# Patient Record
Sex: FEMALE | Race: WHITE | NOT HISPANIC OR LATINO | URBAN - METROPOLITAN AREA
[De-identification: names, ages, dates, MRNs, and addresses within clinical notes are randomized per-mention and may not be internally consistent; named-entity substitution may affect disease eponyms.]

---

## 2017-05-02 ENCOUNTER — FOLLOW UP ESTABLISHED (OUTPATIENT)
Dept: URBAN - METROPOLITAN AREA CLINIC 44 | Facility: CLINIC | Age: 82
End: 2017-05-02
Payer: COMMERCIAL

## 2017-05-02 PROCEDURE — 99213 OFFICE O/P EST LOW 20 MIN: CPT | Performed by: OPHTHALMOLOGY

## 2017-05-02 ASSESSMENT — INTRAOCULAR PRESSURE
OD: 16
OS: 16

## 2017-08-15 ENCOUNTER — FOLLOW UP ESTABLISHED (OUTPATIENT)
Dept: URBAN - METROPOLITAN AREA CLINIC 44 | Facility: CLINIC | Age: 82
End: 2017-08-15
Payer: COMMERCIAL

## 2017-08-15 PROCEDURE — 92012 INTRM OPH EXAM EST PATIENT: CPT | Performed by: OPHTHALMOLOGY

## 2017-08-15 ASSESSMENT — INTRAOCULAR PRESSURE
OS: 14
OD: 12

## 2017-11-20 ENCOUNTER — FOLLOW UP ESTABLISHED (OUTPATIENT)
Dept: URBAN - METROPOLITAN AREA CLINIC 44 | Facility: CLINIC | Age: 82
End: 2017-11-20
Payer: COMMERCIAL

## 2017-11-20 DIAGNOSIS — H35.3131 NONEXUDATIVE MACULAR DEGENERATION, EARLY DRY STAGE, BILATERAL: ICD-10-CM

## 2017-11-20 DIAGNOSIS — H40.1131 PRIMARY OPEN-ANGLE GLAUCOMA, BILATERAL, MILD STAGE: ICD-10-CM

## 2017-11-20 PROCEDURE — 92014 COMPRE OPH EXAM EST PT 1/>: CPT | Performed by: OPHTHALMOLOGY

## 2017-11-20 PROCEDURE — 92134 CPTRZ OPH DX IMG PST SGM RTA: CPT | Performed by: OPHTHALMOLOGY

## 2017-11-20 ASSESSMENT — KERATOMETRY
OS: 44.00
OD: 43.88

## 2017-11-20 ASSESSMENT — INTRAOCULAR PRESSURE
OD: 15
OS: 15

## 2017-11-20 ASSESSMENT — VISUAL ACUITY
OD: 20/25
OS: 20/25

## 2018-01-16 ENCOUNTER — FOLLOW UP ESTABLISHED (OUTPATIENT)
Dept: URBAN - METROPOLITAN AREA CLINIC 44 | Facility: CLINIC | Age: 83
End: 2018-01-16
Payer: MEDICARE

## 2018-01-16 PROCEDURE — 67028 INJECTION EYE DRUG: CPT | Performed by: OPHTHALMOLOGY

## 2018-01-16 PROCEDURE — 92014 COMPRE OPH EXAM EST PT 1/>: CPT | Performed by: OPHTHALMOLOGY

## 2018-01-16 PROCEDURE — 92242 FLUORESCEIN&ICG ANGIOGRAPHY: CPT | Performed by: OPHTHALMOLOGY

## 2018-01-16 PROCEDURE — 92134 CPTRZ OPH DX IMG PST SGM RTA: CPT | Performed by: OPHTHALMOLOGY

## 2018-01-16 ASSESSMENT — INTRAOCULAR PRESSURE
OS: 14
OD: 14

## 2018-02-13 ENCOUNTER — FOLLOW UP ESTABLISHED (OUTPATIENT)
Dept: URBAN - METROPOLITAN AREA CLINIC 44 | Facility: CLINIC | Age: 83
End: 2018-02-13
Payer: MEDICARE

## 2018-02-13 PROCEDURE — 92134 CPTRZ OPH DX IMG PST SGM RTA: CPT | Performed by: OPHTHALMOLOGY

## 2018-02-13 PROCEDURE — 67028 INJECTION EYE DRUG: CPT | Performed by: OPHTHALMOLOGY

## 2018-02-13 ASSESSMENT — INTRAOCULAR PRESSURE
OD: 16
OS: 16

## 2018-03-13 ENCOUNTER — FOLLOW UP ESTABLISHED (OUTPATIENT)
Dept: URBAN - METROPOLITAN AREA CLINIC 44 | Facility: CLINIC | Age: 83
End: 2018-03-13
Payer: MEDICARE

## 2018-03-13 PROCEDURE — 67028 INJECTION EYE DRUG: CPT | Performed by: OPHTHALMOLOGY

## 2018-03-13 PROCEDURE — 92134 CPTRZ OPH DX IMG PST SGM RTA: CPT | Performed by: OPHTHALMOLOGY

## 2018-03-13 ASSESSMENT — INTRAOCULAR PRESSURE
OD: 13
OS: 14

## 2018-03-20 ENCOUNTER — FOLLOW UP ESTABLISHED (OUTPATIENT)
Dept: URBAN - METROPOLITAN AREA CLINIC 44 | Facility: CLINIC | Age: 83
End: 2018-03-20
Payer: MEDICARE

## 2018-03-20 DIAGNOSIS — H25.813 COMBINED FORMS OF AGE-RELATED CATARACT, BILATERAL: ICD-10-CM

## 2018-03-20 PROCEDURE — 92250 FUNDUS PHOTOGRAPHY W/I&R: CPT | Performed by: OPTOMETRIST

## 2018-03-20 PROCEDURE — 92014 COMPRE OPH EXAM EST PT 1/>: CPT | Performed by: OPTOMETRIST

## 2018-03-20 PROCEDURE — 92083 EXTENDED VISUAL FIELD XM: CPT | Performed by: OPTOMETRIST

## 2018-03-20 ASSESSMENT — VISUAL ACUITY
OS: 20/25
OD: 20/20

## 2018-03-20 ASSESSMENT — INTRAOCULAR PRESSURE
OD: 10
OS: 10
OD: 15
OS: 15

## 2018-03-20 ASSESSMENT — KERATOMETRY
OS: 43.88
OD: 44.13

## 2018-04-10 ENCOUNTER — FOLLOW UP ESTABLISHED (OUTPATIENT)
Dept: URBAN - METROPOLITAN AREA CLINIC 44 | Facility: CLINIC | Age: 83
End: 2018-04-10
Payer: MEDICARE

## 2018-04-10 DIAGNOSIS — H35.3221 EXDTVE AGE-REL MCLR DEGN, LEFT EYE, WITH ACTV CHRDL NEOVAS: ICD-10-CM

## 2018-04-10 PROCEDURE — 67028 INJECTION EYE DRUG: CPT | Performed by: OPHTHALMOLOGY

## 2018-04-10 PROCEDURE — 92134 CPTRZ OPH DX IMG PST SGM RTA: CPT | Performed by: OPHTHALMOLOGY

## 2018-04-10 PROCEDURE — 92014 COMPRE OPH EXAM EST PT 1/>: CPT | Performed by: OPHTHALMOLOGY

## 2018-04-10 ASSESSMENT — INTRAOCULAR PRESSURE
OD: 17
OS: 12

## 2018-05-08 ENCOUNTER — FOLLOW UP ESTABLISHED (OUTPATIENT)
Dept: URBAN - METROPOLITAN AREA CLINIC 44 | Facility: CLINIC | Age: 83
End: 2018-05-08
Payer: MEDICARE

## 2018-05-08 PROCEDURE — 67028 INJECTION EYE DRUG: CPT | Performed by: OPHTHALMOLOGY

## 2018-05-08 PROCEDURE — 92134 CPTRZ OPH DX IMG PST SGM RTA: CPT | Performed by: OPHTHALMOLOGY

## 2018-05-08 ASSESSMENT — INTRAOCULAR PRESSURE
OD: 16
OS: 13

## 2018-06-19 ENCOUNTER — FOLLOW UP ESTABLISHED (OUTPATIENT)
Dept: URBAN - METROPOLITAN AREA CLINIC 44 | Facility: CLINIC | Age: 83
End: 2018-06-19
Payer: MEDICARE

## 2018-06-19 PROCEDURE — 67028 INJECTION EYE DRUG: CPT | Performed by: OPHTHALMOLOGY

## 2018-06-19 PROCEDURE — 92134 CPTRZ OPH DX IMG PST SGM RTA: CPT | Performed by: OPHTHALMOLOGY

## 2018-06-19 ASSESSMENT — INTRAOCULAR PRESSURE
OS: 10
OD: 10

## 2018-07-18 ENCOUNTER — FOLLOW UP ESTABLISHED (OUTPATIENT)
Dept: URBAN - METROPOLITAN AREA CLINIC 44 | Facility: CLINIC | Age: 83
End: 2018-07-18
Payer: MEDICARE

## 2018-07-18 PROCEDURE — 92012 INTRM OPH EXAM EST PATIENT: CPT | Performed by: OPTOMETRIST

## 2018-07-18 PROCEDURE — 92133 CPTRZD OPH DX IMG PST SGM ON: CPT | Performed by: OPTOMETRIST

## 2018-07-18 ASSESSMENT — INTRAOCULAR PRESSURE
OS: 13
OD: 14

## 2018-07-25 ENCOUNTER — FOLLOW UP ESTABLISHED (OUTPATIENT)
Dept: URBAN - METROPOLITAN AREA CLINIC 44 | Facility: CLINIC | Age: 83
End: 2018-07-25
Payer: MEDICARE

## 2018-07-25 PROCEDURE — 67028 INJECTION EYE DRUG: CPT | Performed by: OPHTHALMOLOGY

## 2018-07-25 PROCEDURE — 92014 COMPRE OPH EXAM EST PT 1/>: CPT | Performed by: OPHTHALMOLOGY

## 2018-07-25 PROCEDURE — 92242 FLUORESCEIN&ICG ANGIOGRAPHY: CPT | Performed by: OPHTHALMOLOGY

## 2018-07-25 PROCEDURE — 92134 CPTRZ OPH DX IMG PST SGM RTA: CPT | Performed by: OPHTHALMOLOGY

## 2018-07-25 ASSESSMENT — INTRAOCULAR PRESSURE
OD: 13
OS: 15

## 2018-10-03 ENCOUNTER — FOLLOW UP ESTABLISHED (OUTPATIENT)
Dept: URBAN - METROPOLITAN AREA CLINIC 44 | Facility: CLINIC | Age: 83
End: 2018-10-03
Payer: MEDICARE

## 2018-10-03 PROCEDURE — 92134 CPTRZ OPH DX IMG PST SGM RTA: CPT | Performed by: OPHTHALMOLOGY

## 2018-10-03 PROCEDURE — 67028 INJECTION EYE DRUG: CPT | Performed by: OPHTHALMOLOGY

## 2018-10-03 ASSESSMENT — INTRAOCULAR PRESSURE
OS: 14
OD: 12

## 2018-11-27 ENCOUNTER — FOLLOW UP ESTABLISHED (OUTPATIENT)
Dept: URBAN - METROPOLITAN AREA CLINIC 44 | Facility: CLINIC | Age: 83
End: 2018-11-27
Payer: MEDICARE

## 2018-11-27 PROCEDURE — 92012 INTRM OPH EXAM EST PATIENT: CPT | Performed by: OPTOMETRIST

## 2018-11-27 RX ORDER — BIMATOPROST 0.1 MG/ML
0.01 % SOLUTION/ DROPS OPHTHALMIC
Qty: 3 | Refills: 3 | Status: INACTIVE
Start: 2018-11-27 | End: 2019-12-16

## 2018-11-27 RX ORDER — DORZOLAMIDE HYDROCHLORIDE AND TIMOLOL MALEATE 20; 5 MG/ML; MG/ML
SOLUTION/ DROPS OPHTHALMIC
Qty: 3 | Refills: 3 | Status: INACTIVE
Start: 2018-11-27 | End: 2019-03-27

## 2018-11-27 ASSESSMENT — INTRAOCULAR PRESSURE
OD: 14
OS: 15

## 2018-11-28 ENCOUNTER — FOLLOW UP ESTABLISHED (OUTPATIENT)
Dept: URBAN - METROPOLITAN AREA CLINIC 44 | Facility: CLINIC | Age: 83
End: 2018-11-28
Payer: MEDICARE

## 2018-11-28 PROCEDURE — 92134 CPTRZ OPH DX IMG PST SGM RTA: CPT | Performed by: OPHTHALMOLOGY

## 2018-11-28 PROCEDURE — 67028 INJECTION EYE DRUG: CPT | Performed by: OPHTHALMOLOGY

## 2018-11-28 ASSESSMENT — INTRAOCULAR PRESSURE
OS: 11
OD: 11

## 2019-01-23 ENCOUNTER — FOLLOW UP ESTABLISHED (OUTPATIENT)
Dept: URBAN - METROPOLITAN AREA CLINIC 44 | Facility: CLINIC | Age: 84
End: 2019-01-23
Payer: MEDICARE

## 2019-01-23 PROCEDURE — 67028 INJECTION EYE DRUG: CPT | Performed by: OPHTHALMOLOGY

## 2019-01-23 PROCEDURE — 92014 COMPRE OPH EXAM EST PT 1/>: CPT | Performed by: OPHTHALMOLOGY

## 2019-01-23 PROCEDURE — 92242 FLUORESCEIN&ICG ANGIOGRAPHY: CPT | Performed by: OPHTHALMOLOGY

## 2019-01-23 PROCEDURE — 92134 CPTRZ OPH DX IMG PST SGM RTA: CPT | Performed by: OPHTHALMOLOGY

## 2019-01-23 ASSESSMENT — INTRAOCULAR PRESSURE
OD: 11
OS: 11

## 2019-03-27 ENCOUNTER — FOLLOW UP ESTABLISHED (OUTPATIENT)
Dept: URBAN - METROPOLITAN AREA CLINIC 44 | Facility: CLINIC | Age: 84
End: 2019-03-27
Payer: MEDICARE

## 2019-03-27 PROCEDURE — 92014 COMPRE OPH EXAM EST PT 1/>: CPT | Performed by: OPTOMETRIST

## 2019-03-27 PROCEDURE — 92134 CPTRZ OPH DX IMG PST SGM RTA: CPT | Performed by: OPTOMETRIST

## 2019-03-27 PROCEDURE — 92133 CPTRZD OPH DX IMG PST SGM ON: CPT | Performed by: OPTOMETRIST

## 2019-03-27 RX ORDER — DORZOLAMIDE HYDROCHLORIDE AND TIMOLOL MALEATE 20; 5 MG/ML; MG/ML
SOLUTION/ DROPS OPHTHALMIC
Qty: 3 | Refills: 3 | Status: INACTIVE
Start: 2019-03-27 | End: 2020-02-26

## 2019-03-27 ASSESSMENT — VISUAL ACUITY
OD: 20/25
OS: 20/25

## 2019-03-27 ASSESSMENT — INTRAOCULAR PRESSURE
OS: 14
OD: 14

## 2019-03-27 ASSESSMENT — KERATOMETRY
OD: 43.63
OS: 44.00

## 2019-04-01 ENCOUNTER — FOLLOW UP ESTABLISHED (OUTPATIENT)
Dept: URBAN - METROPOLITAN AREA CLINIC 44 | Facility: CLINIC | Age: 84
End: 2019-04-01
Payer: MEDICARE

## 2019-04-01 PROCEDURE — 92134 CPTRZ OPH DX IMG PST SGM RTA: CPT | Performed by: OPHTHALMOLOGY

## 2019-04-01 PROCEDURE — 67028 INJECTION EYE DRUG: CPT | Performed by: OPHTHALMOLOGY

## 2019-04-01 ASSESSMENT — INTRAOCULAR PRESSURE
OS: 9
OD: 10

## 2019-06-10 ENCOUNTER — FOLLOW UP ESTABLISHED (OUTPATIENT)
Dept: URBAN - METROPOLITAN AREA CLINIC 44 | Facility: CLINIC | Age: 84
End: 2019-06-10
Payer: MEDICARE

## 2019-06-10 PROCEDURE — 92134 CPTRZ OPH DX IMG PST SGM RTA: CPT | Performed by: OPHTHALMOLOGY

## 2019-06-10 PROCEDURE — 67028 INJECTION EYE DRUG: CPT | Performed by: OPHTHALMOLOGY

## 2019-06-10 ASSESSMENT — INTRAOCULAR PRESSURE
OS: 11
OD: 9

## 2019-08-28 ENCOUNTER — FOLLOW UP ESTABLISHED (OUTPATIENT)
Dept: URBAN - METROPOLITAN AREA CLINIC 44 | Facility: CLINIC | Age: 84
End: 2019-08-28
Payer: MEDICARE

## 2019-08-28 PROCEDURE — 92012 INTRM OPH EXAM EST PATIENT: CPT | Performed by: OPTOMETRIST

## 2019-08-28 PROCEDURE — 92083 EXTENDED VISUAL FIELD XM: CPT | Performed by: OPTOMETRIST

## 2019-08-28 ASSESSMENT — INTRAOCULAR PRESSURE
OS: 15
OD: 16

## 2019-08-30 ENCOUNTER — FOLLOW UP ESTABLISHED (OUTPATIENT)
Dept: URBAN - METROPOLITAN AREA CLINIC 44 | Facility: CLINIC | Age: 84
End: 2019-08-30
Payer: MEDICARE

## 2019-08-30 PROCEDURE — 92014 COMPRE OPH EXAM EST PT 1/>: CPT | Performed by: OPHTHALMOLOGY

## 2019-08-30 PROCEDURE — 92242 FLUORESCEIN&ICG ANGIOGRAPHY: CPT | Performed by: OPHTHALMOLOGY

## 2019-08-30 PROCEDURE — 67028 INJECTION EYE DRUG: CPT | Performed by: OPHTHALMOLOGY

## 2019-08-30 PROCEDURE — 92134 CPTRZ OPH DX IMG PST SGM RTA: CPT | Performed by: OPHTHALMOLOGY

## 2019-08-30 ASSESSMENT — INTRAOCULAR PRESSURE
OD: 13
OS: 14

## 2019-12-09 ENCOUNTER — FOLLOW UP ESTABLISHED (OUTPATIENT)
Dept: URBAN - METROPOLITAN AREA CLINIC 51 | Facility: CLINIC | Age: 84
End: 2019-12-09
Payer: MEDICARE

## 2019-12-09 PROCEDURE — 92134 CPTRZ OPH DX IMG PST SGM RTA: CPT | Performed by: OPHTHALMOLOGY

## 2019-12-09 PROCEDURE — 67028 INJECTION EYE DRUG: CPT | Performed by: OPHTHALMOLOGY

## 2019-12-09 ASSESSMENT — INTRAOCULAR PRESSURE
OS: 13
OD: 12

## 2020-02-26 ENCOUNTER — FOLLOW UP ESTABLISHED (OUTPATIENT)
Dept: URBAN - METROPOLITAN AREA CLINIC 44 | Facility: CLINIC | Age: 85
End: 2020-02-26
Payer: MEDICARE

## 2020-02-26 PROCEDURE — 92133 CPTRZD OPH DX IMG PST SGM ON: CPT | Performed by: OPTOMETRIST

## 2020-02-26 PROCEDURE — 92014 COMPRE OPH EXAM EST PT 1/>: CPT | Performed by: OPTOMETRIST

## 2020-02-26 RX ORDER — BIMATOPROST 0.1 MG/ML
0.01 % SOLUTION/ DROPS OPHTHALMIC
Qty: 3 | Refills: 3 | Status: INACTIVE
Start: 2020-02-26 | End: 2020-09-25

## 2020-02-26 RX ORDER — DORZOLAMIDE HYDROCHLORIDE AND TIMOLOL MALEATE 20; 5 MG/ML; MG/ML
SOLUTION/ DROPS OPHTHALMIC
Qty: 3 | Refills: 3 | Status: INACTIVE
Start: 2020-02-26 | End: 2021-03-01

## 2020-02-26 ASSESSMENT — KERATOMETRY
OD: 43.63
OS: 43.88

## 2020-02-26 ASSESSMENT — INTRAOCULAR PRESSURE
OS: 14
OD: 17
OD: 14
OS: 16

## 2020-02-26 ASSESSMENT — VISUAL ACUITY
OD: 20/25
OS: 20/25

## 2020-03-13 ENCOUNTER — FOLLOW UP ESTABLISHED (OUTPATIENT)
Dept: URBAN - METROPOLITAN AREA CLINIC 44 | Facility: CLINIC | Age: 85
End: 2020-03-13
Payer: MEDICARE

## 2020-03-13 PROCEDURE — 67028 INJECTION EYE DRUG: CPT | Performed by: OPHTHALMOLOGY

## 2020-03-13 PROCEDURE — 92134 CPTRZ OPH DX IMG PST SGM RTA: CPT | Performed by: OPHTHALMOLOGY

## 2020-03-13 ASSESSMENT — INTRAOCULAR PRESSURE
OD: 11
OS: 15

## 2020-06-19 ENCOUNTER — FOLLOW UP ESTABLISHED (OUTPATIENT)
Dept: URBAN - METROPOLITAN AREA CLINIC 44 | Facility: CLINIC | Age: 85
End: 2020-06-19
Payer: MEDICARE

## 2020-06-19 PROCEDURE — 92134 CPTRZ OPH DX IMG PST SGM RTA: CPT | Performed by: OPHTHALMOLOGY

## 2020-06-19 PROCEDURE — 67028 INJECTION EYE DRUG: CPT | Performed by: OPHTHALMOLOGY

## 2020-06-19 PROCEDURE — 92014 COMPRE OPH EXAM EST PT 1/>: CPT | Performed by: OPHTHALMOLOGY

## 2020-06-19 ASSESSMENT — INTRAOCULAR PRESSURE
OS: 20
OD: 22

## 2020-06-25 ENCOUNTER — FOLLOW UP ESTABLISHED (OUTPATIENT)
Dept: URBAN - METROPOLITAN AREA CLINIC 44 | Facility: CLINIC | Age: 85
End: 2020-06-25
Payer: MEDICARE

## 2020-06-25 PROCEDURE — 92014 COMPRE OPH EXAM EST PT 1/>: CPT | Performed by: OPTOMETRIST

## 2020-06-25 ASSESSMENT — INTRAOCULAR PRESSURE
OD: 16
OS: 16

## 2020-08-28 ENCOUNTER — FOLLOW UP ESTABLISHED (OUTPATIENT)
Dept: URBAN - METROPOLITAN AREA CLINIC 44 | Facility: CLINIC | Age: 85
End: 2020-08-28
Payer: MEDICARE

## 2020-08-28 PROCEDURE — 92134 CPTRZ OPH DX IMG PST SGM RTA: CPT | Performed by: OPHTHALMOLOGY

## 2020-08-28 PROCEDURE — 67028 INJECTION EYE DRUG: CPT | Performed by: OPHTHALMOLOGY

## 2020-08-28 ASSESSMENT — INTRAOCULAR PRESSURE
OS: 17
OD: 18

## 2020-08-31 ENCOUNTER — FOLLOW UP ESTABLISHED (OUTPATIENT)
Dept: URBAN - METROPOLITAN AREA CLINIC 44 | Facility: CLINIC | Age: 85
End: 2020-08-31
Payer: MEDICARE

## 2020-08-31 PROCEDURE — 92083 EXTENDED VISUAL FIELD XM: CPT | Performed by: OPTOMETRIST

## 2020-08-31 PROCEDURE — 92012 INTRM OPH EXAM EST PATIENT: CPT | Performed by: OPTOMETRIST

## 2020-08-31 ASSESSMENT — INTRAOCULAR PRESSURE
OD: 16
OS: 15

## 2020-11-20 ENCOUNTER — FOLLOW UP ESTABLISHED (OUTPATIENT)
Dept: URBAN - METROPOLITAN AREA CLINIC 44 | Facility: CLINIC | Age: 85
End: 2020-11-20
Payer: MEDICARE

## 2020-11-20 PROCEDURE — 92134 CPTRZ OPH DX IMG PST SGM RTA: CPT | Performed by: OPHTHALMOLOGY

## 2020-11-20 PROCEDURE — 67028 INJECTION EYE DRUG: CPT | Performed by: OPHTHALMOLOGY

## 2020-11-20 ASSESSMENT — INTRAOCULAR PRESSURE
OS: 11
OD: 10

## 2021-02-12 ENCOUNTER — FOLLOW UP ESTABLISHED (OUTPATIENT)
Dept: URBAN - METROPOLITAN AREA CLINIC 44 | Facility: CLINIC | Age: 86
End: 2021-02-12
Payer: MEDICARE

## 2021-02-12 PROCEDURE — 99214 OFFICE O/P EST MOD 30 MIN: CPT | Performed by: OPHTHALMOLOGY

## 2021-02-12 PROCEDURE — 67028 INJECTION EYE DRUG: CPT | Performed by: OPHTHALMOLOGY

## 2021-02-12 PROCEDURE — 92134 CPTRZ OPH DX IMG PST SGM RTA: CPT | Performed by: OPHTHALMOLOGY

## 2021-02-12 ASSESSMENT — INTRAOCULAR PRESSURE
OS: 15
OD: 16

## 2021-03-01 ENCOUNTER — FOLLOW UP ESTABLISHED (OUTPATIENT)
Dept: URBAN - METROPOLITAN AREA CLINIC 44 | Facility: CLINIC | Age: 86
End: 2021-03-01
Payer: MEDICARE

## 2021-03-01 PROCEDURE — 92133 CPTRZD OPH DX IMG PST SGM ON: CPT | Performed by: OPTOMETRIST

## 2021-03-01 PROCEDURE — 99213 OFFICE O/P EST LOW 20 MIN: CPT | Performed by: OPTOMETRIST

## 2021-03-01 RX ORDER — BIMATOPROST 0.1 MG/ML
0.01 % SOLUTION/ DROPS OPHTHALMIC
Qty: 7.5 | Refills: 3 | Status: INACTIVE
Start: 2021-03-01 | End: 2021-11-22

## 2021-03-01 RX ORDER — DORZOLAMIDE HYDROCHLORIDE AND TIMOLOL MALEATE 20; 5 MG/ML; MG/ML
SOLUTION/ DROPS OPHTHALMIC
Qty: 30 | Refills: 3 | Status: INACTIVE
Start: 2021-03-01 | End: 2022-02-28

## 2021-03-01 ASSESSMENT — INTRAOCULAR PRESSURE
OS: 14
OD: 15

## 2021-03-01 ASSESSMENT — VISUAL ACUITY
OS: 20/30
OD: 20/40

## 2021-03-01 ASSESSMENT — KERATOMETRY
OS: 43.88
OD: 43.88

## 2021-05-07 ENCOUNTER — OFFICE VISIT (OUTPATIENT)
Dept: URBAN - METROPOLITAN AREA CLINIC 44 | Facility: CLINIC | Age: 86
End: 2021-05-07
Payer: MEDICARE

## 2021-05-07 PROCEDURE — 92134 CPTRZ OPH DX IMG PST SGM RTA: CPT | Performed by: OPHTHALMOLOGY

## 2021-05-07 PROCEDURE — 67028 INJECTION EYE DRUG: CPT | Performed by: OPHTHALMOLOGY

## 2021-05-07 ASSESSMENT — INTRAOCULAR PRESSURE
OS: 17
OD: 13

## 2021-05-07 NOTE — IMPRESSION/PLAN
Impression: Exudative macular degeneration, with active choroidal neovascularization, left eye Plan: Active CNVM OS - minimal SRF, continue [id number] avastin OS. Avastin 2/3 OS given today without complication. R/B/A discussed at length.  Review in 12 weeks for avastin 3/3 OS

## 2021-08-13 ENCOUNTER — OFFICE VISIT (OUTPATIENT)
Dept: URBAN - METROPOLITAN AREA CLINIC 44 | Facility: CLINIC | Age: 86
End: 2021-08-13
Payer: MEDICARE

## 2021-08-13 PROCEDURE — 92134 CPTRZ OPH DX IMG PST SGM RTA: CPT | Performed by: OPHTHALMOLOGY

## 2021-08-13 PROCEDURE — 67028 INJECTION EYE DRUG: CPT | Performed by: OPHTHALMOLOGY

## 2021-08-13 ASSESSMENT — INTRAOCULAR PRESSURE
OD: 15
OS: 16

## 2021-08-13 NOTE — IMPRESSION/PLAN
Impression: Exudative macular degeneration, with active choroidal neovascularization, left eye Plan: Active CNVM OS - minimal SRF worse, decrease interval to [id number] avastin OS. Avastin 1/3 OS given today without complication. R/B/A discussed at length.  Review in 10 weeks for avastin 2/3 OS

## 2021-09-01 ENCOUNTER — OFFICE VISIT (OUTPATIENT)
Dept: URBAN - METROPOLITAN AREA CLINIC 44 | Facility: CLINIC | Age: 86
End: 2021-09-01
Payer: MEDICARE

## 2021-09-01 DIAGNOSIS — H04.123 DRY EYE SYNDROME OF BILATERAL LACRIMAL GLANDS: ICD-10-CM

## 2021-09-01 DIAGNOSIS — H52.4 PRESBYOPIA: ICD-10-CM

## 2021-09-01 DIAGNOSIS — H35.3112 NONEXUDATIVE AGE-RELATED MACULAR DEGENERATION, RIGHT EYE, INTERMEDIATE DRY STAGE: ICD-10-CM

## 2021-09-01 PROCEDURE — 99214 OFFICE O/P EST MOD 30 MIN: CPT | Performed by: OPTOMETRIST

## 2021-09-01 PROCEDURE — 92083 EXTENDED VISUAL FIELD XM: CPT | Performed by: OPTOMETRIST

## 2021-09-01 ASSESSMENT — KERATOMETRY
OD: 43.63
OS: 44.00

## 2021-09-01 ASSESSMENT — INTRAOCULAR PRESSURE
OD: 11
OS: 11

## 2021-09-01 ASSESSMENT — VISUAL ACUITY
OD: 20/30
OS: 20/40

## 2021-09-01 NOTE — IMPRESSION/PLAN
Impression: Primary open-angle glaucoma, mild stage, bilateral Plan: Cupping OS>OD
   -Hx of disc hemorrhage OD vs anomolous vessel Pachymetry: 495/498 IOP: 11/11 Aim: mid teens or lower Current meds: lumigan QHS OU, cosopt BID OU Tmax: 23/22 HVF (09/01/21): OD inf and temp ring scotoma (unreliable) OS mild scatter (reliable) Optos taken 09/01/21 OCT RNFL (03/01/21): OD 65 (bdl overall, temporal thinning, bdl inferior thinning) OS 66 (thin overall, temporal thinning, bdl inferior thinning) IOP okay. HVF okay. Continue current meds.   RTC 6 months for IOP + OCT RNFL

## 2021-09-01 NOTE — IMPRESSION/PLAN
Impression: Exudative macular degeneration, with active choroidal neovascularization, left eye Plan: Recently evaluated by Dr. Humberto Rizvi. Follow up as scheduled.

## 2021-09-01 NOTE — IMPRESSION/PLAN
Impression: Nonexudative macular degeneration, intermediate dry stage, right eye Plan: Recently evaluated by Dr. Krys Sepulveda. Follow up as scheduled.

## 2021-09-01 NOTE — IMPRESSION/PLAN
Impression: Combined forms of age-related cataract, bilateral Plan: Eligible for surgery, but patient is satisfied with current level of vision and declined to have surgery at this time. Continue to monitor.

## 2021-10-22 ENCOUNTER — OFFICE VISIT (OUTPATIENT)
Dept: URBAN - METROPOLITAN AREA CLINIC 44 | Facility: CLINIC | Age: 86
End: 2021-10-22
Payer: MEDICARE

## 2021-10-22 PROCEDURE — 67028 INJECTION EYE DRUG: CPT | Performed by: OPHTHALMOLOGY

## 2021-10-22 PROCEDURE — 92134 CPTRZ OPH DX IMG PST SGM RTA: CPT | Performed by: OPHTHALMOLOGY

## 2021-10-22 ASSESSMENT — INTRAOCULAR PRESSURE
OS: 13
OD: 16

## 2021-10-22 NOTE — IMPRESSION/PLAN
Impression: Exudative macular degeneration, with active choroidal neovascularization, left eye Plan: Active CNVM OS - minimal SRF worse, decrease interval to [id number] avastin OS. Avastin 2/3 OS given today without complication. R/B/A discussed at length. Review in 10 weeks for avastin 3/3 OS.

## 2022-01-03 ENCOUNTER — OFFICE VISIT (OUTPATIENT)
Dept: URBAN - METROPOLITAN AREA CLINIC 44 | Facility: CLINIC | Age: 87
End: 2022-01-03
Payer: MEDICARE

## 2022-01-03 PROCEDURE — 67028 INJECTION EYE DRUG: CPT | Performed by: OPHTHALMOLOGY

## 2022-01-03 PROCEDURE — 92134 CPTRZ OPH DX IMG PST SGM RTA: CPT | Performed by: OPHTHALMOLOGY

## 2022-01-03 ASSESSMENT — INTRAOCULAR PRESSURE
OS: 12
OD: 11

## 2022-01-03 NOTE — IMPRESSION/PLAN
Impression: Exudative macular degeneration, with active choroidal neovascularization, left eye Plan: Active CNVM OS - minimal SRF worse, decrease interval to [id number] avastin OS. Avastin 3/3 OS given today without complication. R/B/A discussed at length.  Review in 10 weeks for dilated exam, possible FA/ICG

## 2022-02-28 ENCOUNTER — OFFICE VISIT (OUTPATIENT)
Dept: URBAN - METROPOLITAN AREA CLINIC 44 | Facility: CLINIC | Age: 87
End: 2022-02-28
Payer: MEDICARE

## 2022-02-28 PROCEDURE — 92133 CPTRZD OPH DX IMG PST SGM ON: CPT | Performed by: OPTOMETRIST

## 2022-02-28 PROCEDURE — 99213 OFFICE O/P EST LOW 20 MIN: CPT | Performed by: OPTOMETRIST

## 2022-02-28 RX ORDER — DORZOLAMIDE HYDROCHLORIDE AND TIMOLOL MALEATE 20; 5 MG/ML; MG/ML
SOLUTION/ DROPS OPHTHALMIC
Qty: 30 | Refills: 3 | Status: ACTIVE
Start: 2022-02-28

## 2022-02-28 ASSESSMENT — INTRAOCULAR PRESSURE
OD: 15
OS: 15

## 2022-02-28 NOTE — IMPRESSION/PLAN
Impression: Primary open-angle glaucoma, mild stage, bilateral Plan: Cupping OS>OD
   -Hx of disc hemorrhage OD vs anomolous vessel Pachymetry: 495/498 IOP: 15/15 Aim: mid teens or lower Current meds: lumigan QHS OU, cosopt BID OU Tmax: 23/22 HVF (09/01/21): OD inf and temp ring scotoma (unreliable) OS mild scatter (reliable) Optos taken 09/01/21 OCT RNFL (02/28/22): OD 62 (thin overall, superior thinning, bdl temp/inferior thinning) OS 67 (thin overall, temporal thinning, bdl inferior thinning) IOP okay. OCT slightly worse OD, but poor SS; stable OCT OS. Continue current meds.   RTC 6 months for complete exam + repeat OCT RNFL + 24-2 HVF

## 2022-03-25 ENCOUNTER — OFFICE VISIT (OUTPATIENT)
Dept: URBAN - METROPOLITAN AREA CLINIC 44 | Facility: CLINIC | Age: 87
End: 2022-03-25
Payer: MEDICARE

## 2022-03-25 PROCEDURE — 92134 CPTRZ OPH DX IMG PST SGM RTA: CPT | Performed by: OPHTHALMOLOGY

## 2022-03-25 PROCEDURE — 67028 INJECTION EYE DRUG: CPT | Performed by: OPHTHALMOLOGY

## 2022-03-25 PROCEDURE — 99214 OFFICE O/P EST MOD 30 MIN: CPT | Performed by: OPHTHALMOLOGY

## 2022-03-25 PROCEDURE — 92242 FLUORESCEIN&ICG ANGIOGRAPHY: CPT | Performed by: OPHTHALMOLOGY

## 2022-03-25 ASSESSMENT — INTRAOCULAR PRESSURE
OD: 19
OS: 16

## 2022-03-25 NOTE — IMPRESSION/PLAN
Impression: Exudative macular degeneration, with active choroidal neovascularization, left eye Plan: Active CNVM OS - SRF resolved, extend to 12 weeks Avastin 1/3 OS given today without complication. R/B/A discussed at length.  Review in 12 weeks for avastin 2/3 OS

## 2022-05-06 ENCOUNTER — OFFICE VISIT (OUTPATIENT)
Dept: URBAN - METROPOLITAN AREA CLINIC 44 | Facility: CLINIC | Age: 87
End: 2022-05-06
Payer: MEDICARE

## 2022-05-06 DIAGNOSIS — H35.3221 EXUDATIVE AGE-RELATED MACULAR DEGENERATION, LEFT EYE, WITH ACTIVE CHOROIDAL NEOVASCULARIZATION: ICD-10-CM

## 2022-05-06 DIAGNOSIS — H11.32 SUBCONJUNCTIVAL HEMORRHAGE OF LEFT EYE: Primary | ICD-10-CM

## 2022-05-06 PROCEDURE — 92014 COMPRE OPH EXAM EST PT 1/>: CPT | Performed by: OPHTHALMOLOGY

## 2022-05-06 PROCEDURE — 92134 CPTRZ OPH DX IMG PST SGM RTA: CPT | Performed by: OPHTHALMOLOGY

## 2022-05-06 ASSESSMENT — INTRAOCULAR PRESSURE
OD: 13
OS: 13

## 2022-05-06 NOTE — IMPRESSION/PLAN
Impression: Exudative macular degeneration, with active choroidal neovascularization, left eye Plan: Active CNVM OS - SRF resolved, stable. Continue [id number] avastin OS.  Next avastin OS due in June

## 2022-05-06 NOTE — IMPRESSION/PLAN
Impression: Subconjunctival hemorrhage of left eye: H11.32. Plan: Subconj hemorrhage OS - patient reassured.  Observe

## 2022-06-17 ENCOUNTER — OFFICE VISIT (OUTPATIENT)
Dept: URBAN - METROPOLITAN AREA CLINIC 44 | Facility: CLINIC | Age: 87
End: 2022-06-17
Payer: MEDICARE

## 2022-06-17 DIAGNOSIS — H35.3221 EXUDATIVE AGE-RELATED MACULAR DEGENERATION, LEFT EYE, WITH ACTIVE CHOROIDAL NEOVASCULARIZATION: Primary | ICD-10-CM

## 2022-06-17 PROCEDURE — 67028 INJECTION EYE DRUG: CPT | Performed by: OPHTHALMOLOGY

## 2022-06-17 PROCEDURE — 92134 CPTRZ OPH DX IMG PST SGM RTA: CPT | Performed by: OPHTHALMOLOGY

## 2022-06-17 ASSESSMENT — INTRAOCULAR PRESSURE
OD: 17
OS: 17

## 2022-06-17 NOTE — IMPRESSION/PLAN
Impression: Exudative macular degeneration, with active choroidal neovascularization, left eye Plan: Active CNVM OS - SRF resolved, extend to 12 weeks Avastin 2/3 OS given today without complication. R/B/A discussed at length. Review in 12 weeks for avastin 3/3 OS.

## 2022-08-29 ENCOUNTER — OFFICE VISIT (OUTPATIENT)
Dept: URBAN - METROPOLITAN AREA CLINIC 44 | Facility: CLINIC | Age: 87
End: 2022-08-29
Payer: MEDICARE

## 2022-08-29 DIAGNOSIS — H35.3221 EXUDATIVE AGE-RELATED MACULAR DEGENERATION, LEFT EYE, WITH ACTIVE CHOROIDAL NEOVASCULARIZATION: ICD-10-CM

## 2022-08-29 DIAGNOSIS — H25.813 COMBINED FORMS OF AGE-RELATED CATARACT, BILATERAL: ICD-10-CM

## 2022-08-29 DIAGNOSIS — H35.3112 NONEXUDATIVE AGE-RELATED MACULAR DEGENERATION, RIGHT EYE, INTERMEDIATE DRY STAGE: ICD-10-CM

## 2022-08-29 DIAGNOSIS — H40.1131 PRIMARY OPEN-ANGLE GLAUCOMA, BILATERAL, MILD STAGE: ICD-10-CM

## 2022-08-29 DIAGNOSIS — H04.123 DRY EYE SYNDROME OF BILATERAL LACRIMAL GLANDS: Primary | ICD-10-CM

## 2022-08-29 PROCEDURE — 92083 EXTENDED VISUAL FIELD XM: CPT | Performed by: OPTOMETRIST

## 2022-08-29 PROCEDURE — 92134 CPTRZ OPH DX IMG PST SGM RTA: CPT | Performed by: OPTOMETRIST

## 2022-08-29 PROCEDURE — 92133 CPTRZD OPH DX IMG PST SGM ON: CPT | Performed by: OPTOMETRIST

## 2022-08-29 PROCEDURE — 99214 OFFICE O/P EST MOD 30 MIN: CPT | Performed by: OPTOMETRIST

## 2022-08-29 ASSESSMENT — INTRAOCULAR PRESSURE
OD: 18
OD: 19
OS: 20
OS: 18

## 2022-08-29 ASSESSMENT — KERATOMETRY
OS: 44.13
OD: 43.50

## 2022-08-29 ASSESSMENT — VISUAL ACUITY
OS: 20/40
OD: 20/40

## 2022-08-29 NOTE — IMPRESSION/PLAN
Impression: Nonexudative macular degeneration, intermediate dry stage, right eye Plan: No obvious SRF. Follow up with Dr. Jenny Garza as scheduled.

## 2022-08-29 NOTE — IMPRESSION/PLAN
Impression: Combined forms of age-related cataract, bilateral Plan: Eligible for surgery, but patient is satisfied with current level of vision and declined to have surgery at this time. Monitor.

## 2022-08-29 NOTE — IMPRESSION/PLAN
Impression: Primary open-angle glaucoma, mild stage, bilateral Plan: Cupping OS>OD
   -Hx of disc hemorrhage OD vs anomolous vessel Pachymetry: 495/498 IOP: 15/15 Aim: mid teens or lower Current meds: lumigan QHS OU, cosopt BID OU Tmax: 23/22 HVF (08/29/22): OD multiple areas of lens edge scotoma (unreliable) OS moderate paracentral scatter (unreliable) Optos taken 09/01/21 OCT RNFL (08/29/22): OD 66 (bdl overall, bdl inf thinning) OS 65 (bdl overall, bdl temporal thinning, inferior thinning) IOP higher than usual but OCT stable vs 2021. VF unreliable OU. Continue current meds.   RTC 6 months for IOP + 24-2 HVF

## 2022-08-29 NOTE — IMPRESSION/PLAN
Impression: Exudative macular degeneration, with active choroidal neovascularization, left eye Plan: No obvious SRF OS. Keep appt with Dr. Yvon Solomon as scheduled.

## 2022-12-19 ENCOUNTER — OFFICE VISIT (OUTPATIENT)
Dept: URBAN - METROPOLITAN AREA CLINIC 44 | Facility: CLINIC | Age: 87
End: 2022-12-19
Payer: MEDICARE

## 2022-12-19 DIAGNOSIS — H35.3112 NONEXUDATIVE AGE-RELATED MACULAR DEGENERATION, RIGHT EYE, INTERMEDIATE DRY STAGE: ICD-10-CM

## 2022-12-19 DIAGNOSIS — H35.3221 EXUDATIVE AGE-RELATED MACULAR DEGENERATION, LEFT EYE, WITH ACTIVE CHOROIDAL NEOVASCULARIZATION: Primary | ICD-10-CM

## 2022-12-19 PROCEDURE — 92134 CPTRZ OPH DX IMG PST SGM RTA: CPT | Performed by: OPHTHALMOLOGY

## 2022-12-19 PROCEDURE — 99214 OFFICE O/P EST MOD 30 MIN: CPT | Performed by: OPHTHALMOLOGY

## 2022-12-19 PROCEDURE — 67028 INJECTION EYE DRUG: CPT | Performed by: OPHTHALMOLOGY

## 2022-12-19 ASSESSMENT — INTRAOCULAR PRESSURE
OS: 17
OD: 19

## 2022-12-19 NOTE — IMPRESSION/PLAN
Impression: Exudative macular degeneration, with active choroidal neovascularization, left eye Plan: Active CNVM OS - SRF resolved, extend to 16 weeks Avastin 1/3 OS administered today without complication. R/B/A discussed at length.  Review in 16 weeks for avastin 2/3 OS

## 2023-02-28 ENCOUNTER — OFFICE VISIT (OUTPATIENT)
Dept: URBAN - METROPOLITAN AREA CLINIC 44 | Facility: CLINIC | Age: 88
End: 2023-02-28
Payer: MEDICARE

## 2023-02-28 DIAGNOSIS — H40.1131 PRIMARY OPEN-ANGLE GLAUCOMA, BILATERAL, MILD STAGE: Primary | ICD-10-CM

## 2023-02-28 PROCEDURE — 99213 OFFICE O/P EST LOW 20 MIN: CPT | Performed by: OPTOMETRIST

## 2023-02-28 PROCEDURE — 92083 EXTENDED VISUAL FIELD XM: CPT | Performed by: OPTOMETRIST

## 2023-02-28 ASSESSMENT — INTRAOCULAR PRESSURE
OS: 19
OD: 19

## 2023-02-28 NOTE — IMPRESSION/PLAN
Impression: Primary open-angle glaucoma, mild stage, bilateral Plan: Cupping OS>OD
   -Hx of disc hemorrhage OD vs anomolous vessel Pachymetry: 495/498 IOP: 19/19 Aim: mid teens or lower Current meds: lumigan QHS OU, cosopt BID OU Tmax: 23/22 HVF (08/29/22): OD multiple areas of lens edge scotoma (unreliable) OS moderate paracentral scatter (unreliable) Optos taken 09/01/21 OCT RNFL (08/29/22): OD 66 (bdl overall, bdl inf thinning) OS 65 (bdl overall, bdl temporal thinning, inferior thinning) IOP higher than usual okay. VF okay OU. Continue current meds.   RTC 6 months for Complete Exam + OCT RNFL

## 2023-04-07 ENCOUNTER — PROCEDURE (OUTPATIENT)
Dept: URBAN - METROPOLITAN AREA CLINIC 44 | Facility: CLINIC | Age: 88
End: 2023-04-07
Payer: MEDICARE

## 2023-04-07 DIAGNOSIS — H35.3221 EXUDATIVE AGE-RELATED MACULAR DEGENERATION, LEFT EYE, WITH ACTIVE CHOROIDAL NEOVASCULARIZATION: Primary | ICD-10-CM

## 2023-04-07 PROCEDURE — 92134 CPTRZ OPH DX IMG PST SGM RTA: CPT | Performed by: OPHTHALMOLOGY

## 2023-04-07 PROCEDURE — 67028 INJECTION EYE DRUG: CPT | Performed by: OPHTHALMOLOGY

## 2023-04-07 ASSESSMENT — INTRAOCULAR PRESSURE
OS: 17
OD: 15

## 2023-04-07 NOTE — IMPRESSION/PLAN
Impression: Exudative macular degeneration, with active choroidal neovascularization, left eye Plan: Active CNVM OS - SRF resolved, extend to 16 weeks Avastin 2/3 OS administered today without complication. R/B/A discussed at length.  Review in 16 weeks for avastin 3/3 OS

## 2023-07-28 ENCOUNTER — PROCEDURE (OUTPATIENT)
Dept: URBAN - METROPOLITAN AREA CLINIC 44 | Facility: CLINIC | Age: 88
End: 2023-07-28
Payer: MEDICARE

## 2023-07-28 DIAGNOSIS — H35.3221 EXUDATIVE AGE-RELATED MACULAR DEGENERATION, LEFT EYE, WITH ACTIVE CHOROIDAL NEOVASCULARIZATION: Primary | ICD-10-CM

## 2023-07-28 PROCEDURE — 67028 INJECTION EYE DRUG: CPT | Performed by: OPHTHALMOLOGY

## 2023-07-28 PROCEDURE — 92134 CPTRZ OPH DX IMG PST SGM RTA: CPT | Performed by: OPHTHALMOLOGY

## 2023-07-28 ASSESSMENT — INTRAOCULAR PRESSURE
OS: 16
OD: 14

## 2023-07-31 ENCOUNTER — OFFICE VISIT (OUTPATIENT)
Dept: URBAN - METROPOLITAN AREA CLINIC 44 | Facility: CLINIC | Age: 88
End: 2023-07-31
Payer: MEDICARE

## 2023-07-31 PROCEDURE — 92134 CPTRZ OPH DX IMG PST SGM RTA: CPT | Performed by: OPHTHALMOLOGY

## 2023-07-31 PROCEDURE — 99214 OFFICE O/P EST MOD 30 MIN: CPT | Performed by: OPHTHALMOLOGY

## 2023-07-31 RX ORDER — PREDNISOLONE/GATIFLOX/NEPAFEN 1-0.5-0.1%
1 % SUSPENSION, DROPS(FINAL DOSAGE FORM)(ML) OPHTHALMIC (EYE)
Qty: 5 | Refills: 2 | Status: INACTIVE
Start: 2023-07-31 | End: 2023-08-29

## 2023-07-31 ASSESSMENT — INTRAOCULAR PRESSURE
OD: 19
OS: 17

## 2023-08-02 ENCOUNTER — OFFICE VISIT (OUTPATIENT)
Dept: URBAN - METROPOLITAN AREA CLINIC 44 | Facility: CLINIC | Age: 88
End: 2023-08-02
Payer: MEDICARE

## 2023-08-02 DIAGNOSIS — H20.9 UVEITIS: Primary | ICD-10-CM

## 2023-08-02 PROCEDURE — 99213 OFFICE O/P EST LOW 20 MIN: CPT | Performed by: OPTOMETRIST

## 2023-08-02 ASSESSMENT — INTRAOCULAR PRESSURE
OD: 14
OS: 16

## 2023-08-04 ENCOUNTER — OFFICE VISIT (OUTPATIENT)
Dept: URBAN - METROPOLITAN AREA CLINIC 44 | Facility: CLINIC | Age: 88
End: 2023-08-04
Payer: MEDICARE

## 2023-08-04 DIAGNOSIS — H35.3221 EXUDATIVE MACULAR DEGENERATION, WITH ACTIVE CHOROIDAL NEOVASCULARIZATION, LEFT EYE: ICD-10-CM

## 2023-08-04 DIAGNOSIS — H25.13 AGE-RELATED NUCLEAR CATARACT, BILATERAL: ICD-10-CM

## 2023-08-04 PROCEDURE — 92134 CPTRZ OPH DX IMG PST SGM RTA: CPT | Performed by: OPHTHALMOLOGY

## 2023-08-04 PROCEDURE — 99212 OFFICE O/P EST SF 10 MIN: CPT | Performed by: OPHTHALMOLOGY

## 2023-08-04 ASSESSMENT — INTRAOCULAR PRESSURE
OS: 9
OD: 10

## 2023-08-11 ENCOUNTER — OFFICE VISIT (OUTPATIENT)
Dept: URBAN - METROPOLITAN AREA CLINIC 44 | Facility: CLINIC | Age: 88
End: 2023-08-11

## 2023-08-11 DIAGNOSIS — H20.9 UNSPECIFIED IRIDOCYCLITIS: Primary | ICD-10-CM

## 2023-08-11 PROCEDURE — 99214 OFFICE O/P EST MOD 30 MIN: CPT | Performed by: OPHTHALMOLOGY

## 2023-08-11 PROCEDURE — 92134 CPTRZ OPH DX IMG PST SGM RTA: CPT | Performed by: OPHTHALMOLOGY

## 2023-08-11 RX ORDER — PREDNISOLONE/GATIFLOX/NEPAFEN 1-0.5-0.1%
1 % SUSPENSION, DROPS(FINAL DOSAGE FORM)(ML) OPHTHALMIC (EYE)
Qty: 5 | Refills: 2 | Status: ACTIVE
Start: 2023-08-11

## 2023-08-11 ASSESSMENT — INTRAOCULAR PRESSURE
OD: 13
OS: 12

## 2023-09-22 ENCOUNTER — OFFICE VISIT (OUTPATIENT)
Dept: URBAN - METROPOLITAN AREA CLINIC 44 | Facility: CLINIC | Age: 88
End: 2023-09-22
Payer: MEDICARE

## 2023-09-22 DIAGNOSIS — H20.9 UNSPECIFIED IRIDOCYCLITIS: Primary | ICD-10-CM

## 2023-09-22 PROCEDURE — 92134 CPTRZ OPH DX IMG PST SGM RTA: CPT | Performed by: OPHTHALMOLOGY

## 2023-09-22 PROCEDURE — 99214 OFFICE O/P EST MOD 30 MIN: CPT | Performed by: OPHTHALMOLOGY

## 2023-09-22 ASSESSMENT — INTRAOCULAR PRESSURE
OS: 15
OD: 13

## 2023-11-17 ENCOUNTER — OFFICE VISIT (OUTPATIENT)
Dept: URBAN - METROPOLITAN AREA CLINIC 44 | Facility: CLINIC | Age: 88
End: 2023-11-17
Payer: MEDICARE

## 2023-11-17 DIAGNOSIS — H35.3221 EXUDATIVE AGE-RELATED MACULAR DEGENERATION, LEFT EYE, WITH ACTIVE CHOROIDAL NEOVASCULARIZATION: Primary | ICD-10-CM

## 2023-11-17 PROCEDURE — 67028 INJECTION EYE DRUG: CPT | Performed by: OPHTHALMOLOGY

## 2023-11-17 PROCEDURE — 92134 CPTRZ OPH DX IMG PST SGM RTA: CPT | Performed by: OPHTHALMOLOGY

## 2023-11-17 PROCEDURE — 99214 OFFICE O/P EST MOD 30 MIN: CPT | Performed by: OPHTHALMOLOGY

## 2023-12-19 ENCOUNTER — OFFICE VISIT (OUTPATIENT)
Dept: URBAN - METROPOLITAN AREA CLINIC 44 | Facility: CLINIC | Age: 88
End: 2023-12-19
Payer: MEDICARE

## 2023-12-19 DIAGNOSIS — H04.123 DRY EYE SYNDROME OF BILATERAL LACRIMAL GLANDS: Primary | ICD-10-CM

## 2023-12-19 DIAGNOSIS — H40.1131 PRIMARY OPEN-ANGLE GLAUCOMA, BILATERAL, MILD STAGE: ICD-10-CM

## 2023-12-19 DIAGNOSIS — H35.3112 NONEXUDATIVE AGE-RELATED MACULAR DEGENERATION, RIGHT EYE, INTERMEDIATE DRY STAGE: ICD-10-CM

## 2023-12-19 DIAGNOSIS — H25.813 COMBINED FORMS OF AGE-RELATED CATARACT, BILATERAL: ICD-10-CM

## 2023-12-19 PROCEDURE — 92134 CPTRZ OPH DX IMG PST SGM RTA: CPT | Performed by: OPTOMETRIST

## 2023-12-19 PROCEDURE — 92133 CPTRZD OPH DX IMG PST SGM ON: CPT | Performed by: OPTOMETRIST

## 2023-12-19 PROCEDURE — 99214 OFFICE O/P EST MOD 30 MIN: CPT | Performed by: OPTOMETRIST

## 2023-12-19 ASSESSMENT — KERATOMETRY
OS: 44.00
OD: 43.63

## 2023-12-19 ASSESSMENT — VISUAL ACUITY
OS: 20/30
OD: 20/40

## 2023-12-19 ASSESSMENT — INTRAOCULAR PRESSURE
OD: 14
OS: 13
OS: 17
OD: 17

## 2024-02-09 ENCOUNTER — OFFICE VISIT (OUTPATIENT)
Dept: URBAN - METROPOLITAN AREA CLINIC 44 | Facility: CLINIC | Age: 89
End: 2024-02-09
Payer: MEDICARE

## 2024-02-09 PROCEDURE — 67028 INJECTION EYE DRUG: CPT | Performed by: OPHTHALMOLOGY

## 2024-02-09 PROCEDURE — 92134 CPTRZ OPH DX IMG PST SGM RTA: CPT | Performed by: OPHTHALMOLOGY

## 2024-05-17 ENCOUNTER — OFFICE VISIT (OUTPATIENT)
Dept: URBAN - METROPOLITAN AREA CLINIC 44 | Facility: CLINIC | Age: 89
End: 2024-05-17
Payer: MEDICARE

## 2024-05-17 DIAGNOSIS — H35.3221 EXUDATIVE AGE-RELATED MACULAR DEGENERATION, LEFT EYE, WITH ACTIVE CHOROIDAL NEOVASCULARIZATION: Primary | ICD-10-CM

## 2024-05-17 PROCEDURE — 92134 CPTRZ OPH DX IMG PST SGM RTA: CPT | Performed by: OPHTHALMOLOGY

## 2024-05-17 PROCEDURE — 67028 INJECTION EYE DRUG: CPT | Performed by: OPHTHALMOLOGY

## 2024-05-17 ASSESSMENT — INTRAOCULAR PRESSURE
OS: 14
OD: 13

## 2024-06-19 ENCOUNTER — OFFICE VISIT (OUTPATIENT)
Dept: URBAN - METROPOLITAN AREA CLINIC 44 | Facility: CLINIC | Age: 89
End: 2024-06-19
Payer: MEDICARE

## 2024-06-19 DIAGNOSIS — H40.1131 PRIMARY OPEN-ANGLE GLAUCOMA, BILATERAL, MILD STAGE: Primary | ICD-10-CM

## 2024-06-19 PROCEDURE — 92083 EXTENDED VISUAL FIELD XM: CPT | Performed by: OPTOMETRIST

## 2024-06-19 PROCEDURE — 99213 OFFICE O/P EST LOW 20 MIN: CPT | Performed by: OPTOMETRIST

## 2024-06-19 ASSESSMENT — INTRAOCULAR PRESSURE
OS: 12
OD: 12

## 2024-08-23 ENCOUNTER — OFFICE VISIT (OUTPATIENT)
Dept: URBAN - METROPOLITAN AREA CLINIC 44 | Facility: CLINIC | Age: 89
End: 2024-08-23
Payer: MEDICARE

## 2024-08-23 DIAGNOSIS — H35.3221 EXUDATIVE AGE-RELATED MACULAR DEGENERATION, LEFT EYE, WITH ACTIVE CHOROIDAL NEOVASCULARIZATION: Primary | ICD-10-CM

## 2024-08-23 DIAGNOSIS — H35.3112 NONEXUDATIVE AGE-RELATED MACULAR DEGENERATION, RIGHT EYE, INTERMEDIATE DRY STAGE: ICD-10-CM

## 2024-08-23 PROCEDURE — 92134 CPTRZ OPH DX IMG PST SGM RTA: CPT | Performed by: OPHTHALMOLOGY

## 2024-08-23 PROCEDURE — 99214 OFFICE O/P EST MOD 30 MIN: CPT | Performed by: OPHTHALMOLOGY

## 2024-08-23 PROCEDURE — 67028 INJECTION EYE DRUG: CPT | Performed by: OPHTHALMOLOGY

## 2024-08-23 PROCEDURE — 92242 FLUORESCEIN&ICG ANGIOGRAPHY: CPT | Performed by: OPHTHALMOLOGY

## 2024-08-23 ASSESSMENT — INTRAOCULAR PRESSURE
OD: 9
OS: 10

## 2024-12-13 ENCOUNTER — OFFICE VISIT (OUTPATIENT)
Dept: URBAN - METROPOLITAN AREA CLINIC 44 | Facility: CLINIC | Age: 89
End: 2024-12-13
Payer: MEDICARE

## 2024-12-13 DIAGNOSIS — H35.3221 EXUDATIVE AGE-RELATED MACULAR DEGENERATION, LEFT EYE, WITH ACTIVE CHOROIDAL NEOVASCULARIZATION: Primary | ICD-10-CM

## 2024-12-13 DIAGNOSIS — H35.3112 NONEXUDATIVE AGE-RELATED MACULAR DEGENERATION, RIGHT EYE, INTERMEDIATE DRY STAGE: ICD-10-CM

## 2024-12-13 PROCEDURE — 92134 CPTRZ OPH DX IMG PST SGM RTA: CPT | Performed by: OPHTHALMOLOGY

## 2024-12-13 PROCEDURE — 67028 INJECTION EYE DRUG: CPT | Performed by: OPHTHALMOLOGY

## 2024-12-13 ASSESSMENT — INTRAOCULAR PRESSURE
OS: 12
OD: 8

## 2024-12-19 ENCOUNTER — OFFICE VISIT (OUTPATIENT)
Dept: URBAN - METROPOLITAN AREA CLINIC 44 | Facility: CLINIC | Age: 89
End: 2024-12-19
Payer: MEDICARE

## 2024-12-19 DIAGNOSIS — H04.123 DRY EYE SYNDROME OF BILATERAL LACRIMAL GLANDS: Primary | ICD-10-CM

## 2024-12-19 DIAGNOSIS — H25.813 COMBINED FORMS OF AGE-RELATED CATARACT, BILATERAL: ICD-10-CM

## 2024-12-19 DIAGNOSIS — H35.3112 NONEXUDATIVE AGE-RELATED MACULAR DEGENERATION, RIGHT EYE, INTERMEDIATE DRY STAGE: ICD-10-CM

## 2024-12-19 DIAGNOSIS — H40.1131 PRIMARY OPEN-ANGLE GLAUCOMA, BILATERAL, MILD STAGE: ICD-10-CM

## 2024-12-19 DIAGNOSIS — H35.3221 EXUDATIVE AGE-RELATED MACULAR DEGENERATION, LEFT EYE, WITH ACTIVE CHOROIDAL NEOVASCULARIZATION: ICD-10-CM

## 2024-12-19 PROCEDURE — 99214 OFFICE O/P EST MOD 30 MIN: CPT | Performed by: OPTOMETRIST

## 2024-12-19 PROCEDURE — 92133 CPTRZD OPH DX IMG PST SGM ON: CPT | Performed by: OPTOMETRIST

## 2024-12-19 PROCEDURE — 92134 CPTRZ OPH DX IMG PST SGM RTA: CPT | Performed by: OPTOMETRIST

## 2024-12-19 RX ORDER — LATANOPROST 50 UG/ML
0.005 % SOLUTION OPHTHALMIC
Qty: 2.5 | Refills: 3 | Status: ACTIVE
Start: 2024-12-19

## 2024-12-19 RX ORDER — DORZOLAMIDE HYDROCHLORIDE AND TIMOLOL MALEATE 20; 5 MG/ML; MG/ML
SOLUTION/ DROPS OPHTHALMIC
Qty: 30 | Refills: 3 | Status: ACTIVE
Start: 2024-12-19

## 2024-12-19 ASSESSMENT — INTRAOCULAR PRESSURE
OS: 20
OD: 18
OD: 16
OS: 14

## 2024-12-19 ASSESSMENT — VISUAL ACUITY
OD: 20/40
OS: 20/60

## 2025-04-18 ENCOUNTER — OFFICE VISIT (OUTPATIENT)
Dept: URBAN - METROPOLITAN AREA CLINIC 44 | Facility: CLINIC | Age: OVER 89
End: 2025-04-18
Payer: MEDICARE

## 2025-04-18 DIAGNOSIS — H35.3112 NONEXUDATIVE AGE-RELATED MACULAR DEGENERATION, RIGHT EYE, INTERMEDIATE DRY STAGE: ICD-10-CM

## 2025-04-18 DIAGNOSIS — H35.3221 EXUDATIVE AGE-RELATED MACULAR DEGENERATION, LEFT EYE, WITH ACTIVE CHOROIDAL NEOVASCULARIZATION: Primary | ICD-10-CM

## 2025-04-18 PROCEDURE — 92134 CPTRZ OPH DX IMG PST SGM RTA: CPT | Performed by: OPHTHALMOLOGY

## 2025-04-18 PROCEDURE — 67028 INJECTION EYE DRUG: CPT | Performed by: OPHTHALMOLOGY

## 2025-04-18 ASSESSMENT — INTRAOCULAR PRESSURE
OD: 12
OS: 13

## 2025-08-08 ENCOUNTER — OFFICE VISIT (OUTPATIENT)
Dept: URBAN - METROPOLITAN AREA CLINIC 44 | Facility: CLINIC | Age: OVER 89
End: 2025-08-08
Payer: MEDICARE

## 2025-08-08 DIAGNOSIS — H35.3221 EXUDATIVE AGE-RELATED MACULAR DEGENERATION, LEFT EYE, WITH ACTIVE CHOROIDAL NEOVASCULARIZATION: Primary | ICD-10-CM

## 2025-08-08 DIAGNOSIS — H35.3112 NONEXUDATIVE AGE-RELATED MACULAR DEGENERATION, RIGHT EYE, INTERMEDIATE DRY STAGE: ICD-10-CM

## 2025-08-08 PROCEDURE — 92134 CPTRZ OPH DX IMG PST SGM RTA: CPT | Performed by: OPHTHALMOLOGY

## 2025-08-08 PROCEDURE — 67028 INJECTION EYE DRUG: CPT | Performed by: OPHTHALMOLOGY

## 2025-08-08 PROCEDURE — 99214 OFFICE O/P EST MOD 30 MIN: CPT | Performed by: OPHTHALMOLOGY

## 2025-08-08 ASSESSMENT — INTRAOCULAR PRESSURE
OS: 15
OD: 15